# Patient Record
(demographics unavailable — no encounter records)

---

## 2025-03-04 NOTE — PHYSICAL EXAM
[de-identified] : The patient appears well nourished  and in no apparent distress.  The patient is alert and oriented to person, place, and time.   Affect and mood appear normal.    The head is normocephalic and atraumatic.  The eyes reveal normal sclera and extra ocular muscles are intact.   The neck appears normal with no jugular venous distention or masses noted.   Skin shows normal turgor with no evidence of eczema or psoriasis.  No respiratory distress noted.  The patient ambulates with an antalgic gait.  The left knee has a flexion contracture of 7 degrees.  She flexes to 130 degrees.  There is pain with terminal flexion.  There is tenderness about the medial joint.  There are crepitations with motion.  There is no effusion.  There is a negative [facility] [place] sign.  There is no soft tissue swelling, warmth, or erythema.   There is a negative Lachman sign and negative anterior/posterior drawer.  Negative pivot shift test.  There is no instability to varus/valgus stress.    There is normal strength  in the quadriceps and hamstring muscles.  Strength and sensation are intact distally.  There are normal pulses distally and good capillary refill.  No edema or lymphadenopathy noted.    [de-identified] : AP, lateral, tunnel, and merchant views of the left knee were obtained.  There is lateral joint line narrowing noted on the tunnel view.  There are osteophytes about the medial lateral joint line.  There is also chondrocalcinosis of the menisci.  There appears to be moderate patellofemoral narrowing however the merchant view is oblique and is difficult to evaluate.  The alignment of the knee is normal.  No fractures or dislocations are noted.

## 2025-03-04 NOTE — HISTORY OF PRESENT ILLNESS
[de-identified] : This patient presents for evaluation going complaints of left knee pain.  The pain started about a week ago without injury.  Pain level is 8 out of 10.  Pain localized mostly to the medial aspect of the knee joint.  Patient notes stiffness and pain in the knee.  Pain is worse with weightbearing and stair climbing.  The patient states that the pain is worse with activity and improved by rest. The patient denies numbness and tingling, radicular symptoms, or bowel/bladder dysfunction.  Currently not taking any NSAIDs or pain medicine for this problem.  No history of left knee pain in the past.  Patient denies any mechanical symptoms.

## 2025-03-04 NOTE — DISCUSSION/SUMMARY
[de-identified] : This patient presents today for evaluation regarding new onset of knee pain.  Patient is been having left knee pain for the last week or so without injury.  Her physical exam and x-rays Veals evidence of osteoarthritis and chondrocalcinosis.  I discussed the diagnosis with the patient as well as treatment options.  We discussed viscosupplementation.  Patient would like to hold off on viscosupplementation at the present time.  I did recommend an anti-inflammatory consisting of meloxicam 15 mg each day for the next 10 to 14 days.  I also recommend a course of physical therapy she does have a flexion contracture of the knee joint.  I will see her back in 1 month for follow-up and reevaluation.  At that point we can determine whether or not she would benefit from a course of viscosupplementation.  At least 30 minutes was spent performing the evaluation and management on today's office visit.  This includes but is not limited to preparing to see patient including review of any test results or outside medical records, obtaining and/or reviewing separately obtained history, performing examination and evaluation, counseling and educating the patient on their diagnosis and treatment recommendations, ordering medications, tests, or procedures, documenting clinical information in the electronic health record, independently interpreting results (not separately reported) and communicating results to the patient, and coordination of care.